# Patient Record
Sex: FEMALE | Race: WHITE | NOT HISPANIC OR LATINO | URBAN - NONMETROPOLITAN AREA
[De-identification: names, ages, dates, MRNs, and addresses within clinical notes are randomized per-mention and may not be internally consistent; named-entity substitution may affect disease eponyms.]

---

## 2016-07-11 PROBLEM — H35.3131: Noted: 2017-03-20

## 2016-07-11 PROBLEM — Z96.1: Noted: 2017-03-20

## 2016-07-11 PROBLEM — H01.001: Noted: 2017-03-20

## 2016-07-11 PROBLEM — H01.004: Noted: 2017-03-20

## 2016-07-11 PROBLEM — H52.4: Noted: 2017-03-20

## 2019-03-18 ENCOUNTER — IMPORTED ENCOUNTER (OUTPATIENT)
Dept: URBAN - NONMETROPOLITAN AREA CLINIC 1 | Facility: CLINIC | Age: 84
End: 2019-03-18

## 2019-03-18 PROCEDURE — 92015 DETERMINE REFRACTIVE STATE: CPT

## 2019-03-18 PROCEDURE — 92134 CPTRZ OPH DX IMG PST SGM RTA: CPT

## 2019-03-18 PROCEDURE — 92014 COMPRE OPH EXAM EST PT 1/>: CPT

## 2019-03-18 NOTE — PATIENT DISCUSSION
Dry ARMD OUDiscussed diagnosis with patientDiscussed the chronic nature of this disease and limited treatment optionsConinue taking Preservision eye vitamins daily and monitoring vision for changes with Amsler GridOCT done today; no subretinal fluid OU dry ARMDContinue to monitorRTC in 6 months with Optos Blepharitis OU Discussed diagnosis in detail with patientContinue Ocusoft  lidscrubs BID OUContinue to monitorPseudophakia OUDiscussed diagnosis in detail with patient. Both intraocular implants in place and stable. Continue to monitor. NIDDM sans RetinopathyDiscussed diagnosis with patient. Discussed the risk of diabetic damage of the retina with potential vision loss and the importance of routine follow-up. Emphasized strict blood sugar control. Continue to monitor. 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 Recommend Systane Complete BID-TID OU sample given today. Continue to monitor. Presbyopia OUDiscussed refractive status in detail with patient. New glasses Rx given today. Continue to monitor.; 's Notes: MR 3/18/19DFE 3/18/19OCT 3/18/19Optos 9/17/18

## 2019-05-07 NOTE — PATIENT DISCUSSION
1.  Primary Open Angle Glaucoma OU moderate -    IOP stable s/p Trab ou-- NO meds currently. IOP's 14/6. Discussed importance of compliance. Will continue to monitor. Follow with DR Sung Irvin every 6 mths. (Edgardo and Filemon snyder. Nuclear Sclerotic Cataract OU: Explained how cataracts can effect vision. Recommend clinical observation. The patient was advised to contact us if any change or worsening of vision. 3. Epiretinal Membrane OU -   Has seen DR Familia Joseph but waiting for cataracts to be removed and then DR Barker can remove. 4.  ARMD OU dry -   MILD---Importance of smoking cessation blood pressure control and healthy diet were emphasized. In accordance with the AREDS study a good multivitamin containing EC and Zinc were recommened to be taken daily. Patient was instructed to self monitor their monocular vision (reading/Amsler Grid) at least weekly. Patient should immediately report any new onset of decreased vision or metamorphopsia. 5. Rx dist suns6.   RTN 1 yr CE

## 2019-10-14 ENCOUNTER — IMPORTED ENCOUNTER (OUTPATIENT)
Dept: URBAN - NONMETROPOLITAN AREA CLINIC 1 | Facility: CLINIC | Age: 84
End: 2019-10-14

## 2019-10-14 PROCEDURE — 92250 FUNDUS PHOTOGRAPHY W/I&R: CPT

## 2019-10-14 PROCEDURE — 99214 OFFICE O/P EST MOD 30 MIN: CPT

## 2019-10-14 NOTE — PATIENT DISCUSSION
Dry ARMD OUDiscussed diagnosis with patientDiscussed the chronic nature of this disease and limited treatment optionsConinue taking Preservision eye vitamins daily and monitoring vision for changes with Amsler GridOptos done today; stable with drusen OU. Continue to monitorRTC in 6 months with OCT Blepharitis OU Discussed diagnosis in detail with patientContinue Ocusoft  lidscrubs BID OUContinue to monitorPseudophakia OUDiscussed diagnosis in detail with patient. Both intraocular implants in place and stable. Continue to monitor. NIDDM sans RetinopathyDiscussed diagnosis with patient. Discussed the risk of diabetic damage of the retina with potential vision loss and the importance of routine follow-up. Emphasized strict blood sugar control. Continue to monitor. 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 Recommend Systane Complete BID-TID OU sample given today. Continue to monitor. Presbyopia OUDiscussed refractive status in detail with patient. Continue to monitor.; 's Notes: MR 3/18/19DFE 10/14/19OCT 3/18/19Optos 10/14/19

## 2020-02-06 NOTE — PATIENT DISCUSSION
1.  Primary Open Angle Glaucoma OU moderate -    IOP stable s/p Trab ou-- NO meds currently. IOP's 14/6. Discussed importance of compliance. Will continue to monitor. Follow with DR Edmund Slater every 6 mths. (Gena snyder. Nuclear Sclerotic Cataract OU: Explained how cataracts can effect vision. Recommend clinical observation. The patient was advised to contact us if any change or worsening of vision. 3. Epiretinal Membrane OU -   Has seen DR Lizet Roy but waiting for cataracts to be removed and then DR Barker can remove. 4.  ARMD OU dry -   MILD---Importance of smoking cessation blood pressure control and healthy diet were emphasized. In accordance with the AREDS study a good multivitamin containing EC and Zinc were recommened to be taken daily. Patient was instructed to self monitor their monocular vision (reading/Amsler Grid) at least weekly. Patient should immediately report any new onset of decreased vision or metamorphopsia. 5. Rx dist suns6. VF --radhaman done and form sent to DM--Pt not sure why Esterman VF needed. No Hx stroke or sx. 7.   RTN  6/20  CE  --Cont with DR Edmund Slater

## 2020-05-15 ENCOUNTER — IMPORTED ENCOUNTER (OUTPATIENT)
Dept: URBAN - NONMETROPOLITAN AREA CLINIC 1 | Facility: CLINIC | Age: 85
End: 2020-05-15

## 2020-05-15 PROCEDURE — 99214 OFFICE O/P EST MOD 30 MIN: CPT

## 2020-05-15 PROCEDURE — 92134 CPTRZ OPH DX IMG PST SGM RTA: CPT

## 2020-05-15 NOTE — PATIENT DISCUSSION
Dry ARMD OUDiscussed diagnosis with patientDiscussed the chronic nature of this disease and limited treatment optionsConinue taking Preservision eye vitamins daily and monitoring vision for changes with Amsler GridOCT done today; no subretinal fluid OU. Continue to monitorRTC in 6 months with Optos Blepharitis OU Discussed diagnosis in detail with patientContinue Ocusoft  lidscrubs BID OUContinue to monitorPseudophakia OUDiscussed diagnosis in detail with patient. Both intraocular implants in place and stable. Continue to monitor. NIDDM sans RetinopathyDiscussed diagnosis with patient. Discussed the risk of diabetic damage of the retina with potential vision loss and the importance of routine follow-up. Emphasized strict blood sugar control. Continue to monitor. 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 Continue Systane Complete BID-TID OUContinue to monitor. Presbyopia OUDiscussed refractive status in detail with patient. Continue to monitor.; 's Notes: MR 3/18/19DFE 10/14/19OCT 5/15/20Optos 10/14/19

## 2020-06-01 NOTE — PATIENT DISCUSSION
1.  Primary Open Angle Glaucoma OU moderate -    IOP stable s/p Trab ou-- NO meds currently. IOP's 14/6. Discussed importance of compliance. Will continue to monitor. Follow with DR Brandy Pathak every 6 mths. (Edgardo and Filemon snyder. Nuclear Sclerotic Cataract OU: Explained how cataracts can effect vision. Recommend clinical observation. The patient was advised to contact us if any change or worsening of vision. 3. Epiretinal Membrane OU -   Has seen DR Larry Brown but waiting for cataracts to be removed and then DR Barker can remove. 4.  ARMD OU dry -   MILD---Importance of smoking cessation blood pressure control and healthy diet were emphasized. In accordance with the AREDS study a good multivitamin containing EC and Zinc were recommened to be taken daily. Patient was instructed to self monitor their monocular vision (reading/Amsler Grid) at least weekly. Patient should immediately report any new onset of decreased vision or metamorphopsia. 5. Rx dist suns6. VF --esterman done and form sent to [facility]--Pt not sure why Esterman VF needed. No Hx stroke or sx. 7.   RTN  6/20  CE  --Cont with DR Brandy Pathak

## 2020-07-06 NOTE — PATIENT DISCUSSION
1.  Primary Open Angle Glaucoma OU moderate -   IOP stable s/p Trab ou-- NO meds currently. IOP's 14/6. Discussed importance of compliance. Will continue to monitor. Follow with DR Anselmo Thornton every 6 mths. (Gena snyder. Nuclear Sclerotic Cataract OU: Explained how cataracts can effect vision. Recommend clinical observation. The patient was advised to contact us if any change or worsening of vision. 3. Epiretinal Membrane OU -   Has seen DR Lawrence Mckeon but waiting for cataracts to be removed and then DR Barker can remove. 4.  ARMD OU dry -   MILD---Importance of smoking cessation blood pressure control and healthy diet were emphasized. In accordance with the AREDS study a good multivitamin containing EC and Zinc were recommened to be taken daily. Patient was instructed to self monitor their monocular vision (reading/Amsler Grid) at least weekly. Patient should immediately report any new onset of decreased vision or metamorphopsia. 5. NO rx changes-- Has rx in  dist suns6. VF --esterman done and form sent to [facility]--Pt not sure why Esterman VF needed. No Hx stroke or sx. 7.   RTN  6/21  CE/OPtos for AMD.    --Cont with DR Anselmo Thornton for glc

## 2020-11-16 ENCOUNTER — IMPORTED ENCOUNTER (OUTPATIENT)
Dept: URBAN - NONMETROPOLITAN AREA CLINIC 1 | Facility: CLINIC | Age: 85
End: 2020-11-16

## 2020-11-16 PROCEDURE — 92250 FUNDUS PHOTOGRAPHY W/I&R: CPT

## 2020-11-16 PROCEDURE — 92014 COMPRE OPH EXAM EST PT 1/>: CPT

## 2021-05-17 ENCOUNTER — IMPORTED ENCOUNTER (OUTPATIENT)
Dept: URBAN - NONMETROPOLITAN AREA CLINIC 1 | Facility: CLINIC | Age: 86
End: 2021-05-17

## 2021-05-17 PROCEDURE — 92134 CPTRZ OPH DX IMG PST SGM RTA: CPT

## 2021-05-17 PROCEDURE — 99214 OFFICE O/P EST MOD 30 MIN: CPT

## 2021-05-17 NOTE — PATIENT DISCUSSION
Dry ARMD OUDiscussed diagnosis with patientDiscussed the chronic nature of this disease and limited treatment optionsConinue taking Preservision eye vitamins daily and monitoring vision for changes with Amsler GridOCT done today; no subretinal fluid OU. Continue to monitorRTC in 6 months with Optos Blepharitis OU Discussed diagnosis in detail with patientContinue Ocusoft  lidscrubs BID OUContinue to monitorPseudophakia OUDiscussed diagnosis in detail with patient. Both intraocular implants in place and stable. Continue to monitor. NIDDM sans RetinopathyDiscussed diagnosis with patient. Discussed the risk of diabetic damage of the retina with potential vision loss and the importance of routine follow-up. Emphasized strict blood sugar control. Continue to monitor. 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 Continue Systane Complete BID-TID OUContinue to monitor. Presbyopia OUDiscussed refractive status in detail with patient. Continue to monitor.; 's Notes: MR 3/18/19DFE 11/16/20OCT  5/17/21Optos 11/16/20

## 2021-07-06 NOTE — PATIENT DISCUSSION
1.  Primary Open Angle Glaucoma OU moderate -   IOP stable s/p Trab ou-- NO meds currently. IOP's 14/6. Discussed importance of compliance. Will continue to monitor. Follow with DR Mickie Aj every 6 mths. (Edgardo and Filemon snyder. Nuclear Sclerotic Cataract OU: Explained how cataracts can effect vision. Recommend clinical observation. The patient was advised to contact us if any change or worsening of vision. 3. Epiretinal Membrane OU -   Has seen DR Lissa Brown but waiting for cataracts to be removed and then DR Barker can remove. 4.  ARMD OU dry -   MILD--  OCT 7/6/21  290/365. -Importance of smoking cessation blood pressure control and healthy diet were emphasized. In accordance with the AREDS study a good multivitamin containing EC and Zinc were recommened to be taken daily. Patient was instructed to self monitor their monocular vision (reading/Amsler Grid) at least weekly. Patient should immediately report any new onset of decreased vision or metamorphopsia. 5. NO rx changes-- Has rx in  dist suns6. VF --faye done and form sent to [facility]--Pt not sure why Esterman VF needed. No Hx stroke or sx. 7.   RTN  12m CE/OCT Mac for AMD.    --Cont with DR Mickie Aj for glc

## 2021-11-22 ENCOUNTER — IMPORTED ENCOUNTER (OUTPATIENT)
Dept: URBAN - NONMETROPOLITAN AREA CLINIC 1 | Facility: CLINIC | Age: 86
End: 2021-11-22

## 2021-11-22 PROCEDURE — 99214 OFFICE O/P EST MOD 30 MIN: CPT

## 2021-11-22 PROCEDURE — 92250 FUNDUS PHOTOGRAPHY W/I&R: CPT

## 2021-11-22 NOTE — PATIENT DISCUSSION
Dry ARMD OUDiscussed diagnosis with patientDiscussed the chronic nature of this disease and limited treatment optionsConinue taking Preservision eye vitamins daily and monitoring vision for changes with Amsler GridOptos done today; stable from previous with drusen OU. Continue to monitorRTC in 6 months with OCTBlepharitis OU Discussed diagnosis in detail with patientContinue Ocusoft  lidscrubs BID OUContinue to monitorPseudophakia OUDiscussed diagnosis in detail with patient. Both intraocular implants in place and stable. Continue to monitor. NIDDM sans RetinopathyDiscussed diagnosis with patient. Discussed the risk of diabetic damage of the retina with potential vision loss and the importance of routine follow-up. Emphasized strict blood sugar control. Continue to monitor. 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 Continue Systane Complete BID-TID OUContinue to monitor. Presbyopia OUDiscussed refractive status in detail with patient. Continue to monitor.; 's Notes: MR 3/18/19DFE 11/16/20OCT  5/17/21Optos 11/22/21

## 2021-12-06 NOTE — PATIENT DISCUSSION
1.  Primary Open Angle Glaucoma OU moderate -   IOP stable s/p Trab ou-- NO meds currently. IOP's 14/6. Discussed importance of compliance. Will continue to monitor. Follow with DR Dayana Lucas every 6 mths. (Edgardo and Filemon snyder. Nuclear Sclerotic Cataract OU: Explained how cataracts can effect vision. Recommend clinical observation. The patient was advised to contact us if any change or worsening of vision. 3. Epiretinal Membrane OU -   Has seen DR Jay Husbands but waiting for cataracts to be removed and then DR Barker can remove. 4.  ARMD OU dry -   MILD--  OCT 7/6/21  290/365. -Importance of smoking cessation blood pressure control and healthy diet were emphasized. In accordance with the AREDS study a good multivitamin containing EC and Zinc were recommened to be taken daily. Patient was instructed to self monitor their monocular vision (reading/Amsler Grid) at least weekly. Patient should immediately report any new onset of decreased vision or metamorphopsia. 5. NO rx changes-- Has rx in  [facility] suns6. VF --faye done and form sent to [facility]--Pt not sure why Faye VF needed. No Hx stroke or sx. 7.   RTN  12m CE/OCT Mac for AMD.    --Cont with DR Dayana Lucas for glc

## 2022-04-09 ASSESSMENT — VISUAL ACUITY
OD_SC: 20/60-
OS_SC: 20/40
OS_SC: 20/30-
OD_PH: 20/30-2
OD_PH: 20/40
OS_SC: 20/80+
OD_SC: 20/50-2
OD_SC: 20/40
OS_SC: 20/40-
OS_SC: 20/40
OD_SC: 20/40-1
OS_SC: 20/60-
OD_SC: 20/80
OD_SC: 20/30-

## 2022-04-09 ASSESSMENT — TONOMETRY
OD_IOP_MMHG: 12
OD_IOP_MMHG: 11
OD_IOP_MMHG: 10
OS_IOP_MMHG: 12
OD_IOP_MMHG: 12
OS_IOP_MMHG: 10
OD_IOP_MMHG: 09
OS_IOP_MMHG: 12
OD_IOP_MMHG: 12
OS_IOP_MMHG: 12
OS_IOP_MMHG: 11
OS_IOP_MMHG: 10